# Patient Record
Sex: MALE | Race: WHITE | NOT HISPANIC OR LATINO | Employment: OTHER | URBAN - METROPOLITAN AREA
[De-identification: names, ages, dates, MRNs, and addresses within clinical notes are randomized per-mention and may not be internally consistent; named-entity substitution may affect disease eponyms.]

---

## 2017-05-15 ENCOUNTER — FOLLOW UP (OUTPATIENT)
Dept: URBAN - METROPOLITAN AREA CLINIC 33 | Facility: CLINIC | Age: 72
End: 2017-05-15

## 2017-05-15 VITALS
WEIGHT: 180 LBS | DIASTOLIC BLOOD PRESSURE: 62 MMHG | HEART RATE: 68 BPM | HEIGHT: 69 IN | BODY MASS INDEX: 26.66 KG/M2 | SYSTOLIC BLOOD PRESSURE: 118 MMHG

## 2017-05-15 DIAGNOSIS — H31.091: ICD-10-CM

## 2017-05-15 DIAGNOSIS — E11.3293: ICD-10-CM

## 2017-05-15 DIAGNOSIS — H43.392: ICD-10-CM

## 2017-05-15 DIAGNOSIS — H35.371: ICD-10-CM

## 2017-05-15 DIAGNOSIS — H43.811: ICD-10-CM

## 2017-05-15 PROCEDURE — G8397 DIL MACULA/FUNDUS EXAM/W DOC: HCPCS

## 2017-05-15 PROCEDURE — G8427 DOCREV CUR MEDS BY ELIG CLIN: HCPCS

## 2017-05-15 PROCEDURE — G8417 CALC BMI ABV UP PARAM F/U: HCPCS

## 2017-05-15 PROCEDURE — 2022F DILAT RTA XM EVC RTNOPTHY: CPT

## 2017-05-15 PROCEDURE — 2021F DILAT MACULAR EXAM DONE: CPT

## 2017-05-15 PROCEDURE — 92235 FLUORESCEIN ANGRPH MLTIFRAME: CPT

## 2017-05-15 PROCEDURE — 1036F TOBACCO NON-USER: CPT

## 2017-05-15 PROCEDURE — 92014 COMPRE OPH EXAM EST PT 1/>: CPT

## 2017-05-15 PROCEDURE — 5010F MACUL RESULT PHY/QHP MNG DM: CPT

## 2017-05-15 PROCEDURE — 92250 FUNDUS PHOTOGRAPHY W/I&R: CPT

## 2017-05-15 ASSESSMENT — VISUAL ACUITY
OS_SC: 20/20-1
OD_SC: 20/25-1

## 2017-05-15 ASSESSMENT — TONOMETRY
OS_IOP_MMHG: 10
OD_IOP_MMHG: 10

## 2017-11-06 ENCOUNTER — FOLLOW UP (OUTPATIENT)
Dept: URBAN - METROPOLITAN AREA CLINIC 33 | Facility: CLINIC | Age: 72
End: 2017-11-06

## 2017-11-06 VITALS — WEIGHT: 185 LBS | BODY MASS INDEX: 27.4 KG/M2 | HEIGHT: 69 IN

## 2017-11-06 DIAGNOSIS — H43.392: ICD-10-CM

## 2017-11-06 DIAGNOSIS — H43.811: ICD-10-CM

## 2017-11-06 DIAGNOSIS — H31.091: ICD-10-CM

## 2017-11-06 DIAGNOSIS — E11.3293: ICD-10-CM

## 2017-11-06 DIAGNOSIS — H35.373: ICD-10-CM

## 2017-11-06 PROCEDURE — 2021F DILAT MACULAR EXAM DONE: CPT

## 2017-11-06 PROCEDURE — 5010F MACUL RESULT PHY/QHP MNG DM: CPT

## 2017-11-06 PROCEDURE — G8417 CALC BMI ABV UP PARAM F/U: HCPCS

## 2017-11-06 PROCEDURE — G8397 DIL MACULA/FUNDUS EXAM/W DOC: HCPCS

## 2017-11-06 PROCEDURE — 92014 COMPRE OPH EXAM EST PT 1/>: CPT

## 2017-11-06 PROCEDURE — 92226 OPHTHALMOSCOPY (SUB): CPT

## 2017-11-06 PROCEDURE — G8427 DOCREV CUR MEDS BY ELIG CLIN: HCPCS

## 2017-11-06 PROCEDURE — 1036F TOBACCO NON-USER: CPT

## 2017-11-06 PROCEDURE — 92134 CPTRZ OPH DX IMG PST SGM RTA: CPT

## 2017-11-06 PROCEDURE — 2022F DILAT RTA XM EVC RTNOPTHY: CPT

## 2017-11-06 ASSESSMENT — VISUAL ACUITY
OS_SC: 20/20-1
OD_SC: 20/30

## 2017-11-06 ASSESSMENT — TONOMETRY
OD_IOP_MMHG: 10
OS_IOP_MMHG: 10

## 2018-05-07 ENCOUNTER — FOLLOW UP (OUTPATIENT)
Dept: URBAN - METROPOLITAN AREA CLINIC 33 | Facility: CLINIC | Age: 73
End: 2018-05-07

## 2018-05-07 VITALS — WEIGHT: 180 LBS | BODY MASS INDEX: 26.66 KG/M2 | HEIGHT: 69 IN

## 2018-05-07 DIAGNOSIS — H43.392: ICD-10-CM

## 2018-05-07 DIAGNOSIS — H35.373: ICD-10-CM

## 2018-05-07 DIAGNOSIS — E11.3293: ICD-10-CM

## 2018-05-07 DIAGNOSIS — H31.091: ICD-10-CM

## 2018-05-07 DIAGNOSIS — H43.811: ICD-10-CM

## 2018-05-07 PROCEDURE — 92014 COMPRE OPH EXAM EST PT 1/>: CPT

## 2018-05-07 PROCEDURE — 92250 FUNDUS PHOTOGRAPHY W/I&R: CPT

## 2018-05-07 ASSESSMENT — TONOMETRY
OS_IOP_MMHG: 12
OD_IOP_MMHG: 13

## 2018-05-07 ASSESSMENT — VISUAL ACUITY
OS_SC: 20/20-1
OD_SC: 20/25-2

## 2018-11-07 ENCOUNTER — FOLLOW UP (OUTPATIENT)
Dept: URBAN - METROPOLITAN AREA CLINIC 33 | Facility: CLINIC | Age: 73
End: 2018-11-07

## 2018-11-07 VITALS — DIASTOLIC BLOOD PRESSURE: 70 MMHG | HEIGHT: 60 IN | HEART RATE: 64 BPM | SYSTOLIC BLOOD PRESSURE: 120 MMHG

## 2018-11-07 DIAGNOSIS — H31.091: ICD-10-CM

## 2018-11-07 DIAGNOSIS — H43.811: ICD-10-CM

## 2018-11-07 DIAGNOSIS — E11.3293: ICD-10-CM

## 2018-11-07 DIAGNOSIS — H35.373: ICD-10-CM

## 2018-11-07 DIAGNOSIS — H43.392: ICD-10-CM

## 2018-11-07 PROCEDURE — 92235 FLUORESCEIN ANGRPH MLTIFRAME: CPT

## 2018-11-07 PROCEDURE — 92014 COMPRE OPH EXAM EST PT 1/>: CPT

## 2018-11-07 PROCEDURE — 92250 FUNDUS PHOTOGRAPHY W/I&R: CPT

## 2018-11-07 ASSESSMENT — TONOMETRY
OS_IOP_MMHG: 10
OD_IOP_MMHG: 10

## 2018-11-07 ASSESSMENT — VISUAL ACUITY
OS_SC: 20/25-2
OD_SC: 20/25-2

## 2019-05-08 ENCOUNTER — FOLLOW UP (OUTPATIENT)
Dept: URBAN - METROPOLITAN AREA CLINIC 33 | Facility: CLINIC | Age: 74
End: 2019-05-08

## 2019-05-08 VITALS
BODY MASS INDEX: 25.18 KG/M2 | WEIGHT: 170 LBS | HEIGHT: 69 IN | HEART RATE: 58 BPM | DIASTOLIC BLOOD PRESSURE: 72 MMHG | SYSTOLIC BLOOD PRESSURE: 118 MMHG

## 2019-05-08 DIAGNOSIS — H35.373: ICD-10-CM

## 2019-05-08 DIAGNOSIS — H31.091: ICD-10-CM

## 2019-05-08 DIAGNOSIS — H43.811: ICD-10-CM

## 2019-05-08 DIAGNOSIS — H43.392: ICD-10-CM

## 2019-05-08 DIAGNOSIS — E11.3293: ICD-10-CM

## 2019-05-08 PROCEDURE — 92235 FLUORESCEIN ANGRPH MLTIFRAME: CPT

## 2019-05-08 PROCEDURE — 92250 FUNDUS PHOTOGRAPHY W/I&R: CPT

## 2019-05-08 PROCEDURE — 92014 COMPRE OPH EXAM EST PT 1/>: CPT

## 2019-05-08 ASSESSMENT — TONOMETRY
OD_IOP_MMHG: 10
OS_IOP_MMHG: 10

## 2019-05-08 ASSESSMENT — VISUAL ACUITY
OS_SC: 20/30-1
OD_SC: 20/25

## 2019-11-06 ENCOUNTER — FOLLOW UP (OUTPATIENT)
Dept: URBAN - METROPOLITAN AREA CLINIC 33 | Facility: CLINIC | Age: 74
End: 2019-11-06

## 2019-11-06 VITALS — HEART RATE: 60 BPM | DIASTOLIC BLOOD PRESSURE: 80 MMHG | HEIGHT: 60 IN | SYSTOLIC BLOOD PRESSURE: 132 MMHG

## 2019-11-06 DIAGNOSIS — H35.373: ICD-10-CM

## 2019-11-06 DIAGNOSIS — H43.392: ICD-10-CM

## 2019-11-06 DIAGNOSIS — H31.091: ICD-10-CM

## 2019-11-06 DIAGNOSIS — E11.3293: ICD-10-CM

## 2019-11-06 DIAGNOSIS — H43.811: ICD-10-CM

## 2019-11-06 PROCEDURE — 92014 COMPRE OPH EXAM EST PT 1/>: CPT

## 2019-11-06 PROCEDURE — 92235 FLUORESCEIN ANGRPH MLTIFRAME: CPT

## 2019-11-06 PROCEDURE — 92250 FUNDUS PHOTOGRAPHY W/I&R: CPT

## 2019-11-06 ASSESSMENT — TONOMETRY
OS_IOP_MMHG: 10
OD_IOP_MMHG: 10

## 2019-11-06 ASSESSMENT — VISUAL ACUITY
OD_SC: 20/25
OS_SC: 20/25-1

## 2020-11-04 ENCOUNTER — FOLLOW UP (OUTPATIENT)
Dept: URBAN - METROPOLITAN AREA CLINIC 33 | Facility: CLINIC | Age: 75
End: 2020-11-04

## 2020-11-04 VITALS
HEART RATE: 62 BPM | WEIGHT: 170 LBS | BODY MASS INDEX: 25.18 KG/M2 | DIASTOLIC BLOOD PRESSURE: 75 MMHG | HEIGHT: 69 IN | SYSTOLIC BLOOD PRESSURE: 140 MMHG

## 2020-11-04 DIAGNOSIS — H43.811: ICD-10-CM

## 2020-11-04 DIAGNOSIS — H35.373: ICD-10-CM

## 2020-11-04 DIAGNOSIS — H31.091: ICD-10-CM

## 2020-11-04 DIAGNOSIS — E11.3292: ICD-10-CM

## 2020-11-04 DIAGNOSIS — H43.392: ICD-10-CM

## 2020-11-04 DIAGNOSIS — E11.3211: ICD-10-CM

## 2020-11-04 PROCEDURE — 92250 FUNDUS PHOTOGRAPHY W/I&R: CPT

## 2020-11-04 PROCEDURE — 92235 FLUORESCEIN ANGRPH MLTIFRAME: CPT

## 2020-11-04 PROCEDURE — 92014 COMPRE OPH EXAM EST PT 1/>: CPT

## 2020-11-04 PROCEDURE — 67210 TREATMENT OF RETINAL LESION: CPT

## 2020-11-04 ASSESSMENT — TONOMETRY
OD_IOP_MMHG: 10
OS_IOP_MMHG: 10

## 2020-11-04 ASSESSMENT — VISUAL ACUITY
OS_SC: 20/30+2
OD_SC: 20/25-1

## 2021-10-01 NOTE — PATIENT DISCUSSION
Narrow angles confirmed with OCT. Will proceed with LPI's OU today. Use steroid QID OU for 5 days following LPI.  Post op with DPM.

## 2021-10-13 ENCOUNTER — FOLLOW UP (OUTPATIENT)
Dept: URBAN - METROPOLITAN AREA CLINIC 33 | Facility: CLINIC | Age: 76
End: 2021-10-13

## 2021-10-13 VITALS
DIASTOLIC BLOOD PRESSURE: 77 MMHG | BODY MASS INDEX: 25.18 KG/M2 | WEIGHT: 170 LBS | HEART RATE: 57 BPM | SYSTOLIC BLOOD PRESSURE: 148 MMHG | HEIGHT: 69 IN

## 2021-10-13 DIAGNOSIS — E11.3211: ICD-10-CM

## 2021-10-13 DIAGNOSIS — H43.392: ICD-10-CM

## 2021-10-13 DIAGNOSIS — H43.811: ICD-10-CM

## 2021-10-13 DIAGNOSIS — E11.3292: ICD-10-CM

## 2021-10-13 DIAGNOSIS — H31.091: ICD-10-CM

## 2021-10-13 DIAGNOSIS — H35.371: ICD-10-CM

## 2021-10-13 DIAGNOSIS — H35.372: ICD-10-CM

## 2021-10-13 PROCEDURE — 92235 FLUORESCEIN ANGRPH MLTIFRAME: CPT

## 2021-10-13 PROCEDURE — 92014 COMPRE OPH EXAM EST PT 1/>: CPT

## 2021-10-13 PROCEDURE — 92134 CPTRZ OPH DX IMG PST SGM RTA: CPT

## 2021-10-13 ASSESSMENT — TONOMETRY
OD_IOP_MMHG: 14
OS_IOP_MMHG: 13

## 2021-10-13 ASSESSMENT — VISUAL ACUITY
OD_SC: 20/20-
OS_SC: 20/20
OS_SC: 20/25

## 2022-10-26 ENCOUNTER — COMPREHENSIVE EXAM (OUTPATIENT)
Dept: URBAN - METROPOLITAN AREA CLINIC 33 | Facility: CLINIC | Age: 77
End: 2022-10-26

## 2022-10-26 VITALS — HEIGHT: 69 IN | WEIGHT: 170 LBS | BODY MASS INDEX: 25.18 KG/M2

## 2022-10-26 DIAGNOSIS — E11.3213: ICD-10-CM

## 2022-10-26 DIAGNOSIS — H43.812: ICD-10-CM

## 2022-10-26 DIAGNOSIS — H31.091: ICD-10-CM

## 2022-10-26 DIAGNOSIS — H43.392: ICD-10-CM

## 2022-10-26 DIAGNOSIS — H35.373: ICD-10-CM

## 2022-10-26 PROCEDURE — 92250 FUNDUS PHOTOGRAPHY W/I&R: CPT

## 2022-10-26 PROCEDURE — 92235 FLUORESCEIN ANGRPH MLTIFRAME: CPT

## 2022-10-26 PROCEDURE — 92014 COMPRE OPH EXAM EST PT 1/>: CPT

## 2022-10-26 PROCEDURE — 92134 CPTRZ OPH DX IMG PST SGM RTA: CPT

## 2022-10-26 ASSESSMENT — TONOMETRY
OD_IOP_MMHG: 9
OS_IOP_MMHG: 9

## 2022-10-26 ASSESSMENT — VISUAL ACUITY
OD_SC: 20/25+2
OS_SC: 20/25-2

## 2023-10-17 ENCOUNTER — FOLLOW UP (OUTPATIENT)
Dept: URBAN - METROPOLITAN AREA CLINIC 33 | Facility: CLINIC | Age: 78
End: 2023-10-17

## 2023-10-17 VITALS
HEART RATE: 58 BPM | SYSTOLIC BLOOD PRESSURE: 153 MMHG | WEIGHT: 160 LBS | HEIGHT: 69 IN | BODY MASS INDEX: 23.7 KG/M2 | DIASTOLIC BLOOD PRESSURE: 82 MMHG

## 2023-10-17 DIAGNOSIS — H43.392: ICD-10-CM

## 2023-10-17 DIAGNOSIS — H31.091: ICD-10-CM

## 2023-10-17 DIAGNOSIS — H35.373: ICD-10-CM

## 2023-10-17 DIAGNOSIS — H43.812: ICD-10-CM

## 2023-10-17 DIAGNOSIS — E11.3213: ICD-10-CM

## 2023-10-17 PROCEDURE — 92250 FUNDUS PHOTOGRAPHY W/I&R: CPT

## 2023-10-17 PROCEDURE — 92235 FLUORESCEIN ANGRPH MLTIFRAME: CPT

## 2023-10-17 PROCEDURE — 92134 CPTRZ OPH DX IMG PST SGM RTA: CPT

## 2023-10-17 PROCEDURE — 92014 COMPRE OPH EXAM EST PT 1/>: CPT

## 2023-10-17 ASSESSMENT — TONOMETRY
OD_IOP_MMHG: 11
OS_IOP_MMHG: 10

## 2023-10-17 ASSESSMENT — VISUAL ACUITY
OS_SC: 20/30+1
OS_PH: 20/25
OD_SC: 20/40-2
OD_PH: 20/25+1

## 2024-06-27 ENCOUNTER — APPOINTMENT (RX ONLY)
Dept: URBAN - METROPOLITAN AREA CLINIC 126 | Facility: CLINIC | Age: 79
Setting detail: DERMATOLOGY
End: 2024-06-27

## 2024-06-27 DIAGNOSIS — L57.0 ACTINIC KERATOSIS: ICD-10-CM

## 2024-06-27 DIAGNOSIS — D49.2 NEOPLASM OF UNSPECIFIED BEHAVIOR OF BONE, SOFT TISSUE, AND SKIN: ICD-10-CM

## 2024-06-27 DIAGNOSIS — D18.0 HEMANGIOMA: ICD-10-CM

## 2024-06-27 DIAGNOSIS — Z71.89 OTHER SPECIFIED COUNSELING: ICD-10-CM

## 2024-06-27 DIAGNOSIS — L81.4 OTHER MELANIN HYPERPIGMENTATION: ICD-10-CM

## 2024-06-27 DIAGNOSIS — L82.1 OTHER SEBORRHEIC KERATOSIS: ICD-10-CM

## 2024-06-27 PROBLEM — D18.01 HEMANGIOMA OF SKIN AND SUBCUTANEOUS TISSUE: Status: ACTIVE | Noted: 2024-06-27

## 2024-06-27 PROCEDURE — 17003 DESTRUCT PREMALG LES 2-14: CPT

## 2024-06-27 PROCEDURE — ? BIOPSY BY SHAVE METHOD

## 2024-06-27 PROCEDURE — ? LIQUID NITROGEN

## 2024-06-27 PROCEDURE — 11102 TANGNTL BX SKIN SINGLE LES: CPT

## 2024-06-27 PROCEDURE — ? SUNSCREEN RECOMMENDATIONS

## 2024-06-27 PROCEDURE — ? COUNSELING

## 2024-06-27 PROCEDURE — 11103 TANGNTL BX SKIN EA SEP/ADDL: CPT

## 2024-06-27 PROCEDURE — 99203 OFFICE O/P NEW LOW 30 MIN: CPT | Mod: 25

## 2024-06-27 PROCEDURE — 17000 DESTRUCT PREMALG LESION: CPT | Mod: 59

## 2024-06-27 ASSESSMENT — LOCATION SIMPLE DESCRIPTION DERM
LOCATION SIMPLE: UPPER BACK
LOCATION SIMPLE: LOWER BACK
LOCATION SIMPLE: LEFT EAR
LOCATION SIMPLE: SCALP
LOCATION SIMPLE: SUPERIOR FOREHEAD
LOCATION SIMPLE: ABDOMEN
LOCATION SIMPLE: LEFT FOREARM
LOCATION SIMPLE: RIGHT FOREHEAD

## 2024-06-27 ASSESSMENT — LOCATION ZONE DERM
LOCATION ZONE: ARM
LOCATION ZONE: FACE
LOCATION ZONE: EAR
LOCATION ZONE: SCALP
LOCATION ZONE: TRUNK

## 2024-06-27 ASSESSMENT — LOCATION DETAILED DESCRIPTION DERM
LOCATION DETAILED: RIGHT SUPERIOR FOREHEAD
LOCATION DETAILED: INFERIOR THORACIC SPINE
LOCATION DETAILED: LEFT VENTRAL PROXIMAL FOREARM
LOCATION DETAILED: SUPERIOR MID FOREHEAD
LOCATION DETAILED: LEFT CYMBA CONCHA
LOCATION DETAILED: PERIUMBILICAL SKIN
LOCATION DETAILED: LEFT SUPERIOR PARIETAL SCALP
LOCATION DETAILED: LEFT DISTAL DORSAL FOREARM
LOCATION DETAILED: SUPERIOR LUMBAR SPINE
LOCATION DETAILED: EPIGASTRIC SKIN

## 2024-06-27 NOTE — PROCEDURE: BIOPSY BY SHAVE METHOD
Detail Level: Detailed
Depth Of Biopsy: dermis
Was A Bandage Applied: Yes
Size Of Lesion In Cm: 0
Biopsy Type: H and E
Biopsy Method: Personna blade
Anesthesia Type: 1% lidocaine with 1:200,000 epinephrine and a 1:10 solution of 8.4% sodium bicarbonate
Anesthesia Volume In Cc: 0.5
Hemostasis: Electrocautery and Aluminum Chloride
Wound Care: Petrolatum
Dressing: bandage
Destruction After The Procedure: No
Type Of Destruction Used: Curettage
Curettage Text: The wound bed was treated with curettage after the biopsy was performed.
Cryotherapy Text: The wound bed was treated with cryotherapy after the biopsy was performed.
Electrodesiccation Text: The wound bed was treated with electrodesiccation after the biopsy was performed.
Electrodesiccation And Curettage Text: The wound bed was treated with electrodesiccation and curettage after the biopsy was performed.
Silver Nitrate Text: The wound bed was treated with silver nitrate after the biopsy was performed.
Lab: [id number]
Lab Facility: [id number]
Consent: Written consent was obtained and risks were reviewed including but not limited to scarring, infection, bleeding, scabbing, incomplete removal, nerve damage and allergy to anesthesia.
Post-Care Instructions: I reviewed with the patient in detail post-care instructions. Patient is to keep the biopsy site dry overnight, and then apply bacitracin twice daily until healed. Patient may apply hydrogen peroxide soaks to remove any crusting.
Notification Instructions: Patient will be notified of biopsy results. However, patient instructed to call the office if not contacted within 2 weeks.
Billing Type: Third-Party Bill
Information: Selecting Yes will display possible errors in your note based on the variables you have selected. This validation is only offered as a suggestion for you. PLEASE NOTE THAT THE VALIDATION TEXT WILL BE REMOVED WHEN YOU FINALIZE YOUR NOTE. IF YOU WANT TO FAX A PRELIMINARY NOTE YOU WILL NEED TO TOGGLE THIS TO 'NO' IF YOU DO NOT WANT IT IN YOUR FAXED NOTE.
Body Location Override (Optional - Billing Will Still Be Based On Selected Body Map Location If Applicable): left ventral forearm

## 2024-06-27 NOTE — PROCEDURE: LIQUID NITROGEN
Consent: The patient's consent was obtained including but not limited to risks of crusting, scabbing, blistering, scarring, darker or lighter pigmentary change, recurrence, incomplete removal and infection.
Show Aperture Variable?: Yes
Post-Care Instructions: I reviewed with the patient in detail post-care instructions. Patient is to wear sunprotection, and avoid picking at any of the treated lesions. Pt may apply Vaseline to crusted or scabbing areas.
Duration Of Freeze Thaw-Cycle (Seconds): 0
Render Post-Care Instructions In Note?: no
Application Tool (Optional): Liquid Nitrogen Sprayer
Number Of Freeze-Thaw Cycles: 3 freeze-thaw cycles
Detail Level: Detailed

## 2024-08-29 ENCOUNTER — APPOINTMENT (RX ONLY)
Dept: URBAN - METROPOLITAN AREA CLINIC 126 | Facility: CLINIC | Age: 79
Setting detail: DERMATOLOGY
End: 2024-08-29

## 2024-08-29 PROBLEM — D04.62 CARCINOMA IN SITU OF SKIN OF LEFT UPPER LIMB, INCLUDING SHOULDER: Status: ACTIVE | Noted: 2024-08-29

## 2024-08-29 PROCEDURE — ? COUNSELING

## 2024-08-29 PROCEDURE — 17262 DSTRJ MAL LES T/A/L 1.1-2.0: CPT

## 2024-08-29 PROCEDURE — 17261 DSTRJ MAL LES T/A/L .6-1.0CM: CPT

## 2024-08-29 PROCEDURE — ? CURETTAGE AND DESTRUCTION

## 2024-08-29 NOTE — PROCEDURE: CURETTAGE AND DESTRUCTION
Body Location Override (Optional - Billing Will Still Be Based On Selected Body Map Location If Applicable): left distal dorsal forearm
Detail Level: Detailed
Biopsy Photograph Reviewed: Yes
Number Of Curettages: 3
Size Of Lesion In Cm: 1.5
Size Of Lesion After Curettage: 1.9
Add Intralesional Injection: No
Total Volume (Ccs): 1
Anesthesia Type: 1% lidocaine with 1:200,000 epinephrine and a 1:10 solution of 8.4% sodium bicarbonate
Anesthesia Volume In Cc: 2
Cautery Type: electrodesiccation
What Was Performed First?: Curettage
Final Size Statement: The size of the lesion after curettage but before electrodesiccation was
Additional Information: (Optional): The wound was cleaned, and a pressure dressing was applied.  The patient received detailed post-op instructions.
Consent was obtained from the patient. The risks, benefits and alternatives to therapy were discussed in detail. Specifically, the risks of infection, scarring, bleeding, prolonged wound healing, nerve injury, incomplete removal, allergy to anesthesia and recurrence were addressed. Alternatives to ED&C, such as: surgical removal and XRT were also discussed.  Prior to the procedure, the treatment site was clearly identified and confirmed by the patient. All components of Universal Protocol/PAUSE Rule completed.
Post-Care Instructions: I reviewed with the patient in detail post-care instructions. Patient is to keep the area dry for 48 hours, and not to engage in any swimming until the area is healed. Should the patient develop any fevers, chills, bleeding, severe pain patient will contact the office immediately.
Bill As A Line Item Or As Units: Line Item
Body Location Override (Optional - Billing Will Still Be Based On Selected Body Map Location If Applicable): left ventral forearm
Size Of Lesion In Cm: 0.5
Size Of Lesion After Curettage: 0.9

## 2024-11-06 ENCOUNTER — FOLLOW UP (OUTPATIENT)
Dept: URBAN - METROPOLITAN AREA CLINIC 33 | Facility: CLINIC | Age: 79
End: 2024-11-06

## 2024-11-06 VITALS — HEIGHT: 69 IN | BODY MASS INDEX: 22.96 KG/M2 | WEIGHT: 155 LBS

## 2024-11-06 DIAGNOSIS — Z96.1: ICD-10-CM

## 2024-11-06 DIAGNOSIS — H35.373: ICD-10-CM

## 2024-11-06 DIAGNOSIS — E11.3213: ICD-10-CM

## 2024-11-06 DIAGNOSIS — H02.834: ICD-10-CM

## 2024-11-06 DIAGNOSIS — H43.812: ICD-10-CM

## 2024-11-06 DIAGNOSIS — H02.831: ICD-10-CM

## 2024-11-06 DIAGNOSIS — H31.091: ICD-10-CM

## 2024-11-06 PROCEDURE — 92235 FLUORESCEIN ANGRPH MLTIFRAME: CPT

## 2024-11-06 PROCEDURE — 92134 CPTRZ OPH DX IMG PST SGM RTA: CPT

## 2024-11-06 PROCEDURE — 92014 COMPRE OPH EXAM EST PT 1/>: CPT

## 2024-11-06 PROCEDURE — 92250 FUNDUS PHOTOGRAPHY W/I&R: CPT | Mod: 59

## 2024-11-06 RX ORDER — KETOROLAC TROMETHAMINE 5 MG/ML: 1 SOLUTION OPHTHALMIC

## 2024-12-30 ENCOUNTER — FOLLOW UP (OUTPATIENT)
Age: 79
End: 2024-12-30

## 2024-12-30 DIAGNOSIS — H02.831: ICD-10-CM

## 2024-12-30 DIAGNOSIS — H31.091: ICD-10-CM

## 2024-12-30 DIAGNOSIS — H43.812: ICD-10-CM

## 2024-12-30 DIAGNOSIS — H02.834: ICD-10-CM

## 2024-12-30 DIAGNOSIS — H35.373: ICD-10-CM

## 2024-12-30 DIAGNOSIS — E11.3213: ICD-10-CM

## 2024-12-30 DIAGNOSIS — Z96.1: ICD-10-CM

## 2024-12-30 PROCEDURE — 92250 FUNDUS PHOTOGRAPHY W/I&R: CPT

## 2024-12-30 PROCEDURE — 99213 OFFICE O/P EST LOW 20 MIN: CPT

## 2024-12-30 PROCEDURE — 92134 CPTRZ OPH DX IMG PST SGM RTA: CPT

## 2025-02-28 ENCOUNTER — APPOINTMENT (OUTPATIENT)
Dept: URBAN - METROPOLITAN AREA CLINIC 126 | Facility: CLINIC | Age: 80
Setting detail: DERMATOLOGY
End: 2025-02-28

## 2025-02-28 DIAGNOSIS — L70.8 OTHER ACNE: ICD-10-CM

## 2025-02-28 DIAGNOSIS — L82.1 OTHER SEBORRHEIC KERATOSIS: ICD-10-CM

## 2025-02-28 DIAGNOSIS — L81.4 OTHER MELANIN HYPERPIGMENTATION: ICD-10-CM

## 2025-02-28 DIAGNOSIS — Z71.89 OTHER SPECIFIED COUNSELING: ICD-10-CM

## 2025-02-28 DIAGNOSIS — Z85.828 PERSONAL HISTORY OF OTHER MALIGNANT NEOPLASM OF SKIN: ICD-10-CM

## 2025-02-28 DIAGNOSIS — L57.0 ACTINIC KERATOSIS: ICD-10-CM

## 2025-02-28 PROCEDURE — 17000 DESTRUCT PREMALG LESION: CPT

## 2025-02-28 PROCEDURE — ? COUNSELING

## 2025-02-28 PROCEDURE — ? LIQUID NITROGEN

## 2025-02-28 PROCEDURE — 99213 OFFICE O/P EST LOW 20 MIN: CPT | Mod: 25

## 2025-02-28 PROCEDURE — 17003 DESTRUCT PREMALG LES 2-14: CPT

## 2025-02-28 PROCEDURE — ? SUNSCREEN RECOMMENDATIONS

## 2025-02-28 ASSESSMENT — LOCATION SIMPLE DESCRIPTION DERM
LOCATION SIMPLE: LEFT SCALP
LOCATION SIMPLE: RIGHT UPPER BACK
LOCATION SIMPLE: LEFT EAR
LOCATION SIMPLE: POSTERIOR NECK

## 2025-02-28 ASSESSMENT — LOCATION DETAILED DESCRIPTION DERM
LOCATION DETAILED: LEFT TRIANGULAR FOSSA
LOCATION DETAILED: RIGHT SUPERIOR UPPER BACK
LOCATION DETAILED: RIGHT INFERIOR POSTERIOR NECK
LOCATION DETAILED: RIGHT MEDIAL TRAPEZIAL NECK
LOCATION DETAILED: LEFT MEDIAL FRONTAL SCALP
LOCATION DETAILED: LEFT CENTRAL FRONTAL SCALP

## 2025-02-28 ASSESSMENT — LOCATION ZONE DERM
LOCATION ZONE: SCALP
LOCATION ZONE: EAR
LOCATION ZONE: TRUNK
LOCATION ZONE: NECK

## 2025-02-28 NOTE — PROCEDURE: LIQUID NITROGEN
Duration Of Freeze Thaw-Cycle (Seconds): 0
Detail Level: Detailed
Render Note In Bullet Format When Appropriate: No
Post-Care Instructions: I reviewed with the patient in detail post-care instructions. Patient is to wear sunprotection, and avoid picking at any of the treated lesions. Pt may apply Vaseline to crusted or scabbing areas.
Number Of Freeze-Thaw Cycles: 3 freeze-thaw cycles
Consent: The patient's consent was obtained including but not limited to risks of crusting, scabbing, blistering, scarring, darker or lighter pigmentary change, recurrence, incomplete removal and infection.
Application Tool (Optional): Liquid Nitrogen Sprayer
Show Applicator Variable?: Yes

## 2025-07-02 ENCOUNTER — APPOINTMENT (OUTPATIENT)
Dept: URBAN - METROPOLITAN AREA CLINIC 126 | Facility: CLINIC | Age: 80
Setting detail: DERMATOLOGY
End: 2025-07-02

## 2025-07-02 DIAGNOSIS — Z71.89 OTHER SPECIFIED COUNSELING: ICD-10-CM

## 2025-07-02 DIAGNOSIS — L0390 CELLULITIS AND ABSCESS OF UNSPECIFIED SITES: ICD-10-CM | Status: INADEQUATELY CONTROLLED

## 2025-07-02 DIAGNOSIS — L81.4 OTHER MELANIN HYPERPIGMENTATION: ICD-10-CM

## 2025-07-02 DIAGNOSIS — L0391 CELLULITIS AND ABSCESS OF UNSPECIFIED SITES: ICD-10-CM | Status: INADEQUATELY CONTROLLED

## 2025-07-02 DIAGNOSIS — L82.1 OTHER SEBORRHEIC KERATOSIS: ICD-10-CM

## 2025-07-02 PROBLEM — L02.212 CUTANEOUS ABSCESS OF BACK [ANY PART, EXCEPT BUTTOCK]: Status: ACTIVE | Noted: 2025-07-02

## 2025-07-02 PROCEDURE — ? SUNSCREEN RECOMMENDATIONS

## 2025-07-02 PROCEDURE — ? ORDER TESTS

## 2025-07-02 PROCEDURE — ? PRESCRIPTION

## 2025-07-02 PROCEDURE — ?: Mod: 25

## 2025-07-02 PROCEDURE — ? INCISION AND DRAINAGE

## 2025-07-02 PROCEDURE — ? DIAGNOSIS COMMENT

## 2025-07-02 PROCEDURE — ? COUNSELING

## 2025-07-02 PROCEDURE — ?

## 2025-07-02 RX ORDER — DOXYCYCLINE HYCLATE 100 MG/1
CAPSULE, GELATIN COATED ORAL BID
Qty: 14 | Refills: 0 | Status: ERX | COMMUNITY
Start: 2025-07-02

## 2025-07-02 RX ORDER — MUPIROCIN 20 MG/G
OINTMENT TOPICAL BID
Qty: 22 | Refills: 3 | Status: ERX | COMMUNITY
Start: 2025-07-02

## 2025-07-02 RX ADMIN — MUPIROCIN: 20 OINTMENT TOPICAL at 00:00

## 2025-07-02 RX ADMIN — DOXYCYCLINE HYCLATE: 100 CAPSULE, GELATIN COATED ORAL at 00:00

## 2025-07-02 ASSESSMENT — LOCATION SIMPLE DESCRIPTION DERM
LOCATION SIMPLE: LEFT LOWER BACK
LOCATION SIMPLE: LEFT UPPER BACK
LOCATION SIMPLE: RIGHT UPPER BACK

## 2025-07-02 ASSESSMENT — LOCATION DETAILED DESCRIPTION DERM
LOCATION DETAILED: RIGHT MID-UPPER BACK
LOCATION DETAILED: LEFT SUPERIOR UPPER BACK
LOCATION DETAILED: LEFT INFERIOR LATERAL LOWER BACK

## 2025-07-02 ASSESSMENT — LOCATION ZONE DERM: LOCATION ZONE: TRUNK

## 2025-07-02 NOTE — PROCEDURE: INCISION AND DRAINAGE
Detail Level: Detailed
Lesion Type: Abscess
Method: 5 mm punch
Curette: No
Anesthesia Type: 1% lidocaine with 1:200,000 epinephrine and a 1:10 solution of 8.4% sodium bicarbonate
Anesthesia Volume In Cc: 4
Drainage Amount?: significant
Drainage Type?: keratinaceous
Wound Care: Mupirocin
Dressing: pressure dressing
Epidermal Sutures: 4-0 Ethilon
Epidermal Closure: simple interrupted
Suture Text: The incision was partially closed with
Preparation Text: The area was prepped in the usual clean fashion.
Curette Text (Optional): After the contents were expressed a curette was used to partially remove the cyst wall.
Medical Necessity Clause: The procedure was medically necessary due to one or more of the following: infection, severe pain, erythema, and warmth. These symptoms are either too severe to respond to conservative measures or have failed conservative measures, and, therefore, procedural intervention is medically indicated
Consent was obtained and risks were reviewed including but not limited to delayed wound healing, infection, need for multiple I and D's, and pain.
Post-Care Instructions: I reviewed with the patient in detail post-care instructions. Patient should keep wound covered and call the office should any redness, pain, swelling or worsening occur.

## 2025-07-02 NOTE — PROCEDURE: ORDER TESTS
Performing Laboratory: [id number]
Billing Type: Third-Party Bill
Bill For Surgical Tray: no
Expected Date Of Service: 07/02/2025
Lab Facility: [id number]

## 2025-07-02 NOTE — PROCEDURE: DIAGNOSIS COMMENT
Render Risk Assessment In Note?: no
Comment: Likelihood of recurrence discussed with patient.   Will refer to [facility] for excision if recurs
Detail Level: Simple

## 2025-07-09 ENCOUNTER — APPOINTMENT (OUTPATIENT)
Dept: URBAN - METROPOLITAN AREA CLINIC 126 | Facility: CLINIC | Age: 80
Setting detail: DERMATOLOGY
End: 2025-07-09

## 2025-07-09 DIAGNOSIS — L0391 CELLULITIS AND ABSCESS OF UNSPECIFIED SITES: ICD-10-CM

## 2025-07-09 DIAGNOSIS — L0390 CELLULITIS AND ABSCESS OF UNSPECIFIED SITES: ICD-10-CM

## 2025-07-09 PROBLEM — L02.212 CUTANEOUS ABSCESS OF BACK [ANY PART, EXCEPT BUTTOCK]: Status: ACTIVE | Noted: 2025-07-09

## 2025-07-09 PROCEDURE — ? DIAGNOSIS COMMENT

## 2025-07-09 PROCEDURE — ? OTHER

## 2025-07-09 PROCEDURE — ? PHOTO-DOCUMENTATION

## 2025-07-09 PROCEDURE — ? COUNSELING

## 2025-07-09 ASSESSMENT — LOCATION DETAILED DESCRIPTION DERM: LOCATION DETAILED: RIGHT INFERIOR LATERAL MIDBACK

## 2025-07-09 ASSESSMENT — LOCATION ZONE DERM: LOCATION ZONE: TRUNK

## 2025-07-09 ASSESSMENT — LOCATION SIMPLE DESCRIPTION DERM: LOCATION SIMPLE: RIGHT LOWER BACK

## 2025-07-09 NOTE — PROCEDURE: OTHER
Detail Level: Detailed
Note Text (......Xxx Chief Complaint.): This diagnosis correlates with the
Other (Free Text): Fax referral sent to Dr. Fernando advised patient to follow up with him for further evaluation and excision of abscess
Render Risk Assessment In Note?: no

## 2025-07-09 NOTE — PROCEDURE: DIAGNOSIS COMMENT
Comment: S/P Drainage 7/2/25 Site is well healing advised patient to continue Mupirocin until healed
Render Risk Assessment In Note?: no
Detail Level: Simple

## 2025-07-22 ENCOUNTER — FOLLOW UP (OUTPATIENT)
Age: 80
End: 2025-07-22

## 2025-07-22 VITALS — HEIGHT: 69 IN | WEIGHT: 160 LBS | BODY MASS INDEX: 23.7 KG/M2

## 2025-07-22 DIAGNOSIS — H43.812: ICD-10-CM

## 2025-07-22 DIAGNOSIS — H02.834: ICD-10-CM

## 2025-07-22 DIAGNOSIS — E11.3213: ICD-10-CM

## 2025-07-22 DIAGNOSIS — Z96.1: ICD-10-CM

## 2025-07-22 DIAGNOSIS — H02.831: ICD-10-CM

## 2025-07-22 DIAGNOSIS — H35.373: ICD-10-CM

## 2025-07-22 DIAGNOSIS — H31.091: ICD-10-CM

## 2025-07-22 PROCEDURE — 92014 COMPRE OPH EXAM EST PT 1/>: CPT

## 2025-07-22 PROCEDURE — 92250 FUNDUS PHOTOGRAPHY W/I&R: CPT

## 2025-07-22 PROCEDURE — 92134 CPTRZ OPH DX IMG PST SGM RTA: CPT
